# Patient Record
Sex: MALE | Race: BLACK OR AFRICAN AMERICAN | Employment: UNEMPLOYED | URBAN - METROPOLITAN AREA
[De-identification: names, ages, dates, MRNs, and addresses within clinical notes are randomized per-mention and may not be internally consistent; named-entity substitution may affect disease eponyms.]

---

## 2017-12-20 ENCOUNTER — HOSPITAL ENCOUNTER (EMERGENCY)
Age: 27
Discharge: STILL ADMITTED OR ADMINISTRATIVE | End: 2017-12-20
Attending: EMERGENCY MEDICINE
Payer: SELF-PAY

## 2017-12-20 PROCEDURE — 75810000275 HC EMERGENCY DEPT VISIT NO LEVEL OF CARE

## 2017-12-26 ENCOUNTER — HOSPITAL ENCOUNTER (EMERGENCY)
Age: 27
Discharge: HOME | End: 2017-12-26
Attending: EMERGENCY MEDICINE
Payer: SELF-PAY

## 2017-12-26 VITALS
DIASTOLIC BLOOD PRESSURE: 93 MMHG | SYSTOLIC BLOOD PRESSURE: 148 MMHG | WEIGHT: 165 LBS | OXYGEN SATURATION: 97 % | RESPIRATION RATE: 16 BRPM | TEMPERATURE: 98.8 F | BODY MASS INDEX: 21.87 KG/M2 | HEIGHT: 73 IN | HEART RATE: 89 BPM
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
## 2017-12-26 PROCEDURE — 90471 IMMUNIZATION ADMIN: CPT

## 2017-12-26 PROCEDURE — 90375 RABIES IG IM/SC: CPT | Performed by: EMERGENCY MEDICINE

## 2017-12-26 PROCEDURE — 90675 RABIES VACCINE IM: CPT | Performed by: EMERGENCY MEDICINE

## 2017-12-26 PROCEDURE — 99282 EMERGENCY DEPT VISIT SF MDM: CPT

## 2017-12-26 PROCEDURE — 96372 THER/PROPH/DIAG INJ SC/IM: CPT

## 2017-12-26 PROCEDURE — 90472 IMMUNIZATION ADMIN EACH ADD: CPT

## 2017-12-26 PROCEDURE — 90715 TDAP VACCINE 7 YRS/> IM: CPT | Performed by: EMERGENCY MEDICINE

## 2017-12-26 PROCEDURE — 74011250636 HC RX REV CODE- 250/636: Performed by: EMERGENCY MEDICINE

## 2017-12-26 RX ORDER — AMOXICILLIN AND CLAVULANATE POTASSIUM 875; 125 MG/1; MG/1
1 TABLET, FILM COATED ORAL 2 TIMES DAILY
Qty: 20 TAB | Refills: 0 | Status: SHIPPED | OUTPATIENT
Start: 2017-12-26 | End: 2018-01-05

## 2017-12-26 RX ADMIN — RABIES IMMUNE GLOBULIN (HUMAN) 1500 UNITS: 150 INJECTION INTRAMUSCULAR at 20:58

## 2017-12-26 RX ADMIN — RABIES VACCINE 2.5 UNITS: KIT at 20:55

## 2017-12-26 RX ADMIN — TETANUS TOXOID, REDUCED DIPHTHERIA TOXOID AND ACELLULAR PERTUSSIS VACCINE, ADSORBED 0.5 ML: 5; 2.5; 8; 8; 2.5 SUSPENSION INTRAMUSCULAR at 20:54

## 2017-12-26 NOTE — LETTER
NOTIFICATION RETURN TO WORK / SCHOOL 
 
12/26/2017 9:40 PM 
 
Mr. Josefina Herbert [address] [place] [id number] To Whom It May Concern: 
 
Sherry Barbour is currently under the care of [facility] EMERGENCY DEPT. He will return to work/school on: 12/28/17 If there are questions or concerns please have the patient contact our office. Sincerely, Ramirez Huff RN

## 2017-12-27 NOTE — PROGRESS NOTES
Pt was seen in ED last night with dog bite. Appointment scheduled at [facility] for pt's next rabies vaccine on 12/29/17 at 0900. Placed a call to pt and left a message to return call. Prescription faxed to [facility].

## 2017-12-27 NOTE — DISCHARGE INSTRUCTIONS
Animal Bites: Care Instructions  Your Care Instructions  After an animal bite, the biggest concern is infection. The chance of infection depends on the type of animal that bit you, where on your body you were bitten, and your general health. Many animal bites are not closed with stitches, because this can increase the chance of infection. Your bite may take as little as 7 days or as long as several months to heal, depending on how bad it is. Taking good care of your wound at home will help it heal and reduce your chance of infection. The doctor has checked you carefully, but problems can develop later. If you notice any problems or new symptoms, get medical treatment right away. Follow-up care is a key part of your treatment and safety. Be sure to make and go to all appointments, and call your doctor if you are having problems. It's also a good idea to know your test results and keep a list of the medicines you take. How can you care for yourself at home? · If your doctor told you how to care for your wound, follow your doctor's instructions. If you did not get instructions, follow this general advice:  ¨ After 24 to 48 hours, gently wash the wound with clean water 2 times a day. Do not scrub or soak the wound. Don't use hydrogen peroxide or alcohol, which can slow healing. ¨ You may cover the wound with a thin layer of petroleum jelly, such as Vaseline, and a nonstick bandage. ¨ Apply more petroleum jelly and replace the bandage as needed. · After you shower, gently dry the wound with a clean towel. · If your doctor has closed the wound, cover the bandage with a plastic bag before you take a shower. · A small amount of skin redness and swelling around the wound edges and the stitches or staples is normal. Your wound may itch or feel irritated. Do not scratch or rub the wound.   · Ask your doctor if you can take an over-the-counter pain medicine, such as acetaminophen (Tylenol), ibuprofen (Advil, Motrin), or naproxen (Aleve). Read and follow all instructions on the label. · Do not take two or more pain medicines at the same time unless the doctor told you to. Many pain medicines have acetaminophen, which is Tylenol. Too much acetaminophen (Tylenol) can be harmful. · If your bite puts you at risk for rabies, you will get a series of shots over the next few weeks to prevent rabies. Your doctor will tell you when to get the shots. It is very important that you get the full cycle of shots. Follow your doctor's instructions exactly. · You may need a tetanus shot if you have not received one in the last 5 years. · If your doctor prescribed antibiotics, take them as directed. Do not stop taking them just because you feel better. You need to take the full course of antibiotics. When should you call for help? Call your doctor now or seek immediate medical care if:  ? · The skin near the bite turns cold or pale or it changes color. ? · You lose feeling in the area near the bite, or it feels numb or tingly. ? · You have trouble moving a limb near the bite. ? · You have symptoms of infection, such as:  ¨ Increased pain, swelling, warmth, or redness near the wound. ¨ Red streaks leading from the wound. ¨ Pus draining from the wound. ¨ A fever. ? · Blood soaks through the bandage. Oozing small amounts of blood is normal.   ? · Your pain is getting worse. ? Watch closely for changes in your health, and be sure to contact your doctor if you are not getting better as expected. Where can you learn more? Go to http://robbin-leland.info/. Enter R162 in the search box to learn more about \"Animal Bites: Care Instructions. \"  Current as of: March 20, 2017  Content Version: 11.4  © 6802-9909 [employer]. Care instructions adapted under license by [employer] (which disclaims liability or warranty for this information).  If you have questions about a medical condition or this instruction, always ask your healthcare professional. [place] [address] any warranty or liability for your use of this information.

## 2017-12-27 NOTE — ANCILLARY DISCHARGE INSTRUCTIONS
Spoke with patient about getting shots at the [facility]. Will call patient with follow up instructions on Rabies shots.

## 2017-12-27 NOTE — ED PROVIDER NOTES
EMERGENCY DEPARTMENT HISTORY AND PHYSICAL EXAM    8:36 PM      Date: 12/26/2017  Patient Name: Tobias Wells    History of Presenting Illness     Chief Complaint   Patient presents with    Dog Bite         History Provided By: Patient    Chief Complaint: Dog bite  Duration: 1 Hours  Timing:  Sudden  Location: Left elbow and right calf  Severity: 10 out of 10  Modifying Factors: Pain is exacerbated by palpation  Associated Symptoms: Left arm elbow numbness and scratches on left side of back. Patient denies any other associated symptoms or complaints. Additional History (Context): Tobias Wells is a 28 y.o. male with No significant past medical history who presents with c/o of dog bite to left elbow and right calf. Associated symptoms include left elbow numbness and scratches on left side of back. Patient describes his pain as sudden, constant, rated 10/10 in severity, and is exacerbated by palpation. Patient states that he was walking on the street [place] when a dog suddenly attacked him, biting his left elbow and his right calf. Patient states that he does not know about the dog or who the owner of the dog is. Patient is not sure of last tetanus shot. Patient denies any other associated symptoms or complaints. PCP: None    Current Facility-Administered Medications   Medication Dose Route Frequency Provider Last Rate Last Dose    rabies immune globulin (HYPERAB) 150 unit/mL injection              Current Outpatient Prescriptions   Medication Sig Dispense Refill    rabies vaccine human Diploid, PF, (IMOVAX) 2.5 unit solr injection Please administer on 12/29, 1/2 and 1/9 1 mL 0    amoxicillin-clavulanate (AUGMENTIN) 875-125 mg per tablet Take 1 Tab by mouth two (2) times a day for 10 days. 20 Tab 0       Past History     Past Medical History:  History reviewed. No pertinent past medical history. Past Surgical History:  History reviewed. No pertinent surgical history.     Family History:  History reviewed. No pertinent family history. Social History:  Social History   Substance Use Topics    Smoking status: Current Every Day Smoker     Packs/day: 0.25    Smokeless tobacco: Current User    Alcohol use No       Allergies: Allergies   Allergen Reactions    Shellfish Derived Unknown (comments)         Review of Systems       Review of Systems   Constitutional: Negative for fever. Respiratory: Negative for shortness of breath. Cardiovascular: Negative for chest pain. Skin: Positive for wound (left elbow and right calf). Neurological: Positive for numbness. All other systems reviewed and are negative. Physical Exam     Visit Vitals    BP (!) 148/93 (BP 1 Location: Right arm, BP Patient Position: At rest)    Pulse 89    Temp 98.8 °F (37.1 °C)    Resp 16    Ht 6' 1\" (1.854 m)    Wt 74.8 kg (165 lb)    SpO2 97%    BMI 21.77 kg/m2       Physical Exam   Constitutional: He is oriented to person, place, and time. He appears well-developed and well-nourished. HENT:   Head: Normocephalic and atraumatic. Neck: Neck supple. No JVD present. Pulmonary/Chest: Effort normal. No respiratory distress. Musculoskeletal: He exhibits no edema. Arms:       Legs:  2 cm linear laceration, L upper arm  L back with 4cm linear abrasion  2 puncture wound R lateral calf  1 puncture wound anterior lower shin  5 puncture wounds R medial calf    Gross sensation intact x4 extremities  Full ROM L elbow and wrist  Radial pulse 2+  Normal gait  Strength 5/5 x4 extremities   Neurological: He is alert and oriented to person, place, and time. Skin: Skin is warm and dry. No erythema. Psychiatric: Judgment normal.           Medical Decision Making   I am the first provider for this patient. I reviewed the vital signs, available nursing notes, past medical history, past surgical history, family history and social history. Vital Signs-Reviewed the patient's vital signs.     Pulse Oximetry Analysis -  97% on room air (normal)    Records Reviewed: Nursing Notes and Old Medical Records (Time of Review: 8:36 PM)    ED Course: Progress Notes, Reevaluation, and Consults:    Provider Notes (Medical Decision Making): 29 y/o male presents after attack by dog. Unknown dog, tetanus unknown so will update. Also give rabies vaccine and IG. prophylactic abx  Wounds superficial, no indication for imaging. Diagnosis     Clinical Impression:   1. Dog bite, initial encounter        Disposition: home    Follow-up Information     Follow up With Details Comments Contact Info    [facility]  on 12/19, 1/2, 1/9 [address] [address] [phone]           Discharge Medication List as of 12/26/2017  9:20 PM      START taking these medications    Details   rabies vaccine human Diploid, PF, (IMOVAX) 2.5 unit solr injection Please administer on 12/29, 1/2 and 1/9, Print, Disp-1 mL, R-0      amoxicillin-clavulanate (AUGMENTIN) 875-125 mg per tablet Take 1 Tab by mouth two (2) times a day for 10 days. , Print, Disp-20 Tab, R-0           _______________________________    Attestations:  Scribe Attestation     Monster Josephpooldeannearthur acting as a scribe for and in the presence of Megan Whitman DO      December 26, 2017 at 8:36 PM       Provider Attestation:      I personally performed the services described in the documentation, reviewed the documentation, as recorded by the scribe in my presence, and it accurately and completely records my words and actions.  December 26, 2017 at 8:36 PM - Megan Whitman DO    _______________________________

## 2018-01-02 NOTE — ED PROVIDER NOTES
HPI     No past medical history on file. No past surgical history on file. No family history on file. Social History     Social History    Marital status: SINGLE     Spouse name: N/A    Number of children: N/A    Years of education: N/A     Occupational History    Not on file. Social History Main Topics    Smoking status: Current Every Day Smoker     Packs/day: 0.25    Smokeless tobacco: Current User    Alcohol use No    Drug use: No    Sexual activity: Not on file     Other Topics Concern    Not on file     Social History Narrative         ALLERGIES: Shellfish derived    Review of Systems    There were no vitals filed for this visit.          Physical Exam     MDM  ED Course       Procedures

## 2018-01-03 NOTE — ED PROVIDER NOTES
[facility] EMERGENCY DEPT      Per nursing notes, patient 'arrived in error.'    Adelfo Waters M.D.   [facility] Board Certified Emergency Physician